# Patient Record
Sex: MALE | URBAN - METROPOLITAN AREA
[De-identification: names, ages, dates, MRNs, and addresses within clinical notes are randomized per-mention and may not be internally consistent; named-entity substitution may affect disease eponyms.]

---

## 2022-04-10 ENCOUNTER — HOSPITAL ENCOUNTER (OUTPATIENT)
Dept: TELEMEDICINE | Facility: HOSPITAL | Age: 67
Discharge: HOME | End: 2022-04-10
Payer: COMMERCIAL

## 2022-04-10 DIAGNOSIS — I63.412 STROKE DUE TO EMBOLISM OF LEFT MIDDLE CEREBRAL ARTERY: ICD-10-CM

## 2022-04-10 PROCEDURE — G0427 PR INPT TELEHEALTH CON 70/>M: ICD-10-PCS | Mod: GT,,, | Performed by: PSYCHIATRY & NEUROLOGY

## 2022-04-10 PROCEDURE — G0427 INPT/ED TELECONSULT70: HCPCS | Mod: GT,,, | Performed by: PSYCHIATRY & NEUROLOGY

## 2022-04-17 PROBLEM — I63.412 STROKE DUE TO EMBOLISM OF LEFT MIDDLE CEREBRAL ARTERY: Status: ACTIVE | Noted: 2022-04-17

## 2022-04-17 NOTE — SUBJECTIVE & OBJECTIVE
Woke up with symptoms?: no    Recent bleeding noted: no  Does the patient take any Blood Thinners? yes  Medications: Anticoagulants:  rivaroxaban/Xarelto      Past Medical History: hypertension, stroke and Afib    Past Surgical History: no major surgeries within the last 2 weeks    Family History: no relevant history    Social History: drinking    Allergies: Allergies have not been reviewed No known drug allergies    Review of Systems   Constitutional: Negative for chills and fever.   HENT: Negative for congestion and sore throat.    Eyes: Negative for visual disturbance.   Respiratory: Negative for shortness of breath.    Cardiovascular: Negative for chest pain and palpitations.   Gastrointestinal: Negative for blood in stool, diarrhea, nausea and vomiting.   Genitourinary: Negative for difficulty urinating and hematuria.   Musculoskeletal: Negative for back pain and neck pain.   Neurological: Positive for facial asymmetry, speech difficulty and weakness. Negative for dizziness and headaches.     Objective:   Vitals: There were no vitals taken for this visit. BP: 174/90 and Heart Rate: 98    CT READ: Yes  No hemmorhage. No mass effect. No early infarct signs.     Physical Exam  Vitals reviewed.   Constitutional:       Appearance: Normal appearance. He is well-developed.   HENT:      Head: Normocephalic and atraumatic.      Nose: Nose normal.   Eyes:      Pupils: Pupils are equal, round, and reactive to light.   Cardiovascular:      Rate and Rhythm: Normal rate and regular rhythm.   Pulmonary:      Effort: Pulmonary effort is normal.   Neurological:      Mental Status: He is alert and oriented to person, place, and time.      Cranial Nerves: Cranial nerve deficit, dysarthria and facial asymmetry present.      Sensory: No sensory deficit.      Motor: Weakness present.      Coordination: Coordination normal. Finger-Nose-Finger Test and Heel to Shin Test normal. Rapid alternating movements normal.   Psychiatric:          Mood and Affect: Mood normal.

## 2022-04-17 NOTE — CONSULTS
[facility]  Vascular Neurology  Comprehensive Stroke Center  TeleVascular Neurology Acute Consultation Note      Consults    Consulting Provider: VIANEY ELIZABETH  Current Providers  No providers found    Patient Location:  [facility] Emergency Department  Sp[facility] hospital nurse at bedside with patient assisting consultant.     Patient information was obtained from patient and EMS personnel.         Assessment/Plan:       Diagnoses:   Stroke due to embolism of left middle cerebral artery  Stroke due to embolism of left middle cerebral artery  Antithrombotics for secondary stroke prevention: Anticoagulants: Rivaroxaban 20 mg daily    Statins for secondary stroke prevention and hyperlipidemia, if present:   Statins: Atorvastatin- 40 mg daily    Aggressive risk factor modification: HTN, A-Fib     Rehab efforts: The patient has been evaluated by a stroke team provider and the therapy needs have been fully considered based off the presenting complaints and exam findings. The following therapy evaluations are needed: PT evaluate and treat, OT evaluate and treat, SLP evaluate and treat    Diagnostics ordered/pending: CTA Head to assess vasculature , CTA Neck/Arch to assess vasculature, Lipid Profile to assess cholesterol levels, MRI head without contrast to assess brain parenchyma, TTE to assess cardiac function/status     VTE prophylaxis: None: Reason for No Pharmacological VTE Prophylaxis: Currently on anticoagulation    BP parameters: Infarct: No intervention, SBP <220            STROKE DOCUMENTATION     Acute Stroke Times:   Acute Stroke Times   Last Known Normal Date: 04/10/22  Last Known Normal Time: 1500  Symptom Onset Date: 04/10/22  Symptom Onset Time: 1500  Stroke Team Called Date: 04/10/22  Stroke Team Called Time: 1625  Stroke Team Arrival Date: 04/10/22  Stroke Team Arrival Time: 1635  CT Interpretation Time: 1635  Alteplase Recommended: No  Thrombectomy Recommended:  No    NIH Scale:        Modified Delta Score: 0  Jasper Coma Scale:    ABCD2 Score:    [id number] Score:4  HAS -BLED Score:   ICH Score:   Hunt & Cerna Classification:       There were no vitals taken for this visit.  Alteplase Eligible?: No  Alteplase Recommendation: Alteplase not recommended due to Full dose anticoagulation   Possible Interventional Revascularization Candidate? Awaiting CTA results from [facility] for determination     Disposition Recommendation: admit to inpatient  do not transfer    Subjective:     History of Present Illness:  This is a 67 y/o male with a.fib on Xarelto who was LKN @ 1500 when he developed right hemiparesis and aphasia.        Woke up with symptoms?: no    Recent bleeding noted: no  Does the patient take any Blood Thinners? yes  Medications: Anticoagulants:  rivaroxaban/Xarelto      Past Medical History: hypertension, stroke and Afib    Past Surgical History: no major surgeries within the last 2 weeks    Family History: no relevant history    Social History: drinking    Allergies: Allergies have not been reviewed No known drug allergies    Review of Systems   Constitutional: Negative for chills and fever.   HENT: Negative for congestion and sore throat.    Eyes: Negative for visual disturbance.   Respiratory: Negative for shortness of breath.    Cardiovascular: Negative for chest pain and palpitations.   Gastrointestinal: Negative for blood in stool, diarrhea, nausea and vomiting.   Genitourinary: Negative for difficulty urinating and hematuria.   Musculoskeletal: Negative for back pain and neck pain.   Neurological: Positive for facial asymmetry, speech difficulty and weakness. Negative for dizziness and headaches.     Objective:   Vitals: There were no vitals taken for this visit. BP: 174/90 and Heart Rate: 98    CT READ: Yes  No hemmorhage. No mass effect. No early infarct signs.     Physical Exam  Vitals reviewed.   Constitutional:       Appearance: Normal appearance. He is  well-developed.   HENT:      Head: Normocephalic and atraumatic.      Nose: Nose normal.   Eyes:      Pupils: Pupils are equal, round, and reactive to light.   Cardiovascular:      Rate and Rhythm: Normal rate and regular rhythm.   Pulmonary:      Effort: Pulmonary effort is normal.   Neurological:      Mental Status: He is alert and oriented to person, place, and time.      Cranial Nerves: Cranial nerve deficit, dysarthria and facial asymmetry present.      Sensory: No sensory deficit.      Motor: Weakness present.      Coordination: Coordination normal. Finger-Nose-Finger Test and Heel to Shin Test normal. Rapid alternating movements normal.   Psychiatric:         Mood and Affect: Mood normal.               Recommended the emergency room physician to have a brief discussion with the patient and/or family if available regarding the  risks and benefits of treatment, and to briefly document the occurrence of that discussion in his clinical encounter note.     The attending portion of this evaluation, treatment, and documentation was performed per Shanique Delgado MD via audiovisual.    Billing code:  (moderate to severe stroke, large areas of edema, some mimics)    · This patient has a critical neurological condition/illness, with high morbidity and mortality.  · There is a high probability for acute neurological change leading to clinical and possibly life-threatening deterioration requiring highest level of physician preparedness for urgent intervention.  · Care was coordinated with other physicians involved in the patient's care.  · Radiologic studies and laboratory data were reviewed and interpreted, and plan of care was re-assessed based on the results.  · Diagnosis, treatment options and prognosis may have been discussed with the patient and/or family members or caregiver.  · Further advanced medical management and further evaluation is warranted for his care.      In your opinion, this was a: Tier 1 Van  Positive    Consult End Time: 6890     Shanique Delgado MD  Comprehensive Stroke Center  Vascular Neurology   [facility]

## 2022-04-17 NOTE — HPI
This is a 67 y/o male with a.fib on Xarelto who was LKN @ 1500 when he developed right hemiparesis and aphasia.

## 2022-04-17 NOTE — SUBJECTIVE & OBJECTIVE
Woke up with symptoms?: {YES NO:15473}    Recent bleeding noted: {Yes / No / Unknown:74}  Does the patient take any Blood Thinners? {Yes / No / Unknown:74}  Medications: {[facility] Medications:22269}      Past Medical History: {[facility] MEDICAL HX:96505}    Past Surgical History: {[facility] SURGICAL HX:85030}    Family History: {[facility] MEDICAL HX:00430}    Social History: {[facility] SOCIAL HX:81462}    Allergies: Allergies have not been reviewed {[facility] ALLERGIES:81017}    Review of Systems   Constitutional: Negative for chills and fever.   HENT: Negative for congestion and sore throat.    Eyes: Negative for visual disturbance.   Respiratory: Negative for shortness of breath.    Cardiovascular: Negative for chest pain and palpitations.   Gastrointestinal: Negative for blood in stool, diarrhea, nausea and vomiting.   Genitourinary: Negative for difficulty urinating and hematuria.   Musculoskeletal: Negative for back pain and neck pain.   Neurological: Positive for facial asymmetry, speech difficulty and weakness. Negative for dizziness and headaches.     Objective:   Vitals: There were no vitals taken for this visit. {[facility] VITALS:26868}    CT READ: {[facility] CT READ:05702}    Physical Exam  Vitals reviewed.   Constitutional:       Appearance: Normal appearance. He is well-developed.   HENT:      Head: Normocephalic and atraumatic.      Nose: Nose normal.   Eyes:      Pupils: Pupils are equal, round, and reactive to light.   Cardiovascular:      Rate and Rhythm: Normal rate and regular rhythm.   Pulmonary:      Effort: Pulmonary effort is normal.   Neurological:      Mental Status: He is alert and oriented to person, place, and time.      Cranial Nerves: Cranial nerve deficit, dysarthria and facial asymmetry present.      Sensory: No sensory deficit.      Motor: Weakness present.      Coordination: Coordination normal. Finger-Nose-Finger Test and Heel to Shin Test normal. Rapid alternating  movements normal.   Psychiatric:         Mood and Affect: Mood normal.

## 2022-04-17 NOTE — ASSESSMENT & PLAN NOTE
Stroke due to embolism of left middle cerebral artery  Antithrombotics for secondary stroke prevention: Anticoagulants: Rivaroxaban 20 mg daily    Statins for secondary stroke prevention and hyperlipidemia, if present:   Statins: Atorvastatin- 40 mg daily    Aggressive risk factor modification: HTN, A-Fib     Rehab efforts: The patient has been evaluated by a stroke team provider and the therapy needs have been fully considered based off the presenting complaints and exam findings. The following therapy evaluations are needed: PT evaluate and treat, OT evaluate and treat, SLP evaluate and treat    Diagnostics ordered/pending: CTA Head to assess vasculature , CTA Neck/Arch to assess vasculature, Lipid Profile to assess cholesterol levels, MRI head without contrast to assess brain parenchyma, TTE to assess cardiac function/status     VTE prophylaxis: None: Reason for No Pharmacological VTE Prophylaxis: Currently on anticoagulation    BP parameters: Infarct: No intervention, SBP <220